# Patient Record
Sex: FEMALE | Race: WHITE | Employment: FULL TIME | URBAN - METROPOLITAN AREA
[De-identification: names, ages, dates, MRNs, and addresses within clinical notes are randomized per-mention and may not be internally consistent; named-entity substitution may affect disease eponyms.]

---

## 2017-06-02 PROBLEM — S62.616A CLOSED DISPLACED FRACTURE OF PROXIMAL PHALANX OF RIGHT LITTLE FINGER, INITIAL ENCOUNTER: Status: ACTIVE | Noted: 2017-06-02

## 2019-07-23 PROCEDURE — 88304 TISSUE EXAM BY PATHOLOGIST: CPT

## 2021-02-15 ENCOUNTER — OFFICE VISIT (OUTPATIENT)
Dept: UROLOGY | Facility: HOSPITAL | Age: 41
End: 2021-02-15
Attending: OBSTETRICS & GYNECOLOGY
Payer: COMMERCIAL

## 2021-02-15 VITALS — BODY MASS INDEX: 25.11 KG/M2 | HEIGHT: 67 IN | TEMPERATURE: 98 F | WEIGHT: 160 LBS

## 2021-02-15 DIAGNOSIS — N81.6 RECTOCELE: ICD-10-CM

## 2021-02-15 DIAGNOSIS — N81.84 PELVIC MUSCLE WASTING: ICD-10-CM

## 2021-02-15 DIAGNOSIS — N39.41 URGE INCONTINENCE: ICD-10-CM

## 2021-02-15 DIAGNOSIS — N81.11 CYSTOCELE, MIDLINE: ICD-10-CM

## 2021-02-15 DIAGNOSIS — N39.3 FEMALE STRESS INCONTINENCE: Primary | ICD-10-CM

## 2021-02-15 PROCEDURE — 99212 OFFICE O/P EST SF 10 MIN: CPT

## 2021-03-02 ENCOUNTER — OFFICE VISIT (OUTPATIENT)
Dept: UROLOGY | Facility: HOSPITAL | Age: 41
End: 2021-03-02
Attending: OBSTETRICS & GYNECOLOGY
Payer: COMMERCIAL

## 2021-03-02 VITALS — BODY MASS INDEX: 25.11 KG/M2 | HEIGHT: 67 IN | WEIGHT: 160 LBS

## 2021-03-02 DIAGNOSIS — N81.11 CYSTOCELE, MIDLINE: Primary | ICD-10-CM

## 2021-03-02 DIAGNOSIS — N39.3 FEMALE STRESS INCONTINENCE: ICD-10-CM

## 2021-03-02 NOTE — PATIENT INSTRUCTIONS
[address]  [place] [address]  [place] [address]: [phone]  FAX: [phone]       Urodynamic Testing Discharge Instructions: There are NO dietary or activity restrictions.   You may resume

## 2021-05-20 PROBLEM — Z86.16 HISTORY OF 2019 NOVEL CORONAVIRUS DISEASE (COVID-19): Status: ACTIVE | Noted: 2021-05-20

## 2021-05-20 PROBLEM — R03.0 ELEVATED BLOOD PRESSURE READING: Status: ACTIVE | Noted: 2021-05-20

## 2021-05-20 PROBLEM — S62.616A CLOSED DISPLACED FRACTURE OF PROXIMAL PHALANX OF RIGHT LITTLE FINGER, INITIAL ENCOUNTER: Status: RESOLVED | Noted: 2017-06-02 | Resolved: 2021-05-20

## 2022-06-17 ENCOUNTER — TELEPHONE (OUTPATIENT)
Dept: UROLOGY | Facility: CLINIC | Age: 42
End: 2022-06-17

## 2022-07-20 ENCOUNTER — OFFICE VISIT (OUTPATIENT)
Dept: UROLOGY | Facility: CLINIC | Age: 42
End: 2022-07-20
Attending: OBSTETRICS & GYNECOLOGY
Payer: COMMERCIAL

## 2022-07-20 VITALS — TEMPERATURE: 97 F | BODY MASS INDEX: 26 KG/M2 | WEIGHT: 163 LBS | RESPIRATION RATE: 16 BRPM

## 2022-07-20 DIAGNOSIS — N39.41 URGE INCONTINENCE: ICD-10-CM

## 2022-07-20 DIAGNOSIS — N39.3 FEMALE STRESS INCONTINENCE: Primary | ICD-10-CM

## 2022-07-20 DIAGNOSIS — N81.11 CYSTOCELE, MIDLINE: ICD-10-CM

## 2022-07-20 LAB
CONTROL RUN WITHIN 24 HOURS?: YES
LEUKOCYTE ESTERASE URINE: NEGATIVE
NITRITE URINE: NEGATIVE

## 2022-07-20 PROCEDURE — 51797 INTRAABDOMINAL PRESSURE TEST: CPT

## 2022-07-20 PROCEDURE — 51728 CYSTOMETROGRAM W/VP: CPT

## 2022-07-20 PROCEDURE — 51784 ANAL/URINARY MUSCLE STUDY: CPT

## 2022-07-20 PROCEDURE — 81002 URINALYSIS NONAUTO W/O SCOPE: CPT

## 2022-07-20 PROCEDURE — 51741 ELECTRO-UROFLOWMETRY FIRST: CPT

## 2022-07-20 NOTE — PROCEDURES
Patient here for urodynamic testing. Procedure explained and confirmed by patient. See evaluation form for results. Both verbal and written discharge instructions were given. Patient tolerated procedure well and will follow up with Dr. Vicky Manzano on 22. URODYNAMIC EVALUATION    PATIENT HISTORY:    Prolapse:  Yes - bothersome  DONNIE:  Yes- bothesome - previous sling in   UUI:  Yes- bothersome  Nocturia:  2  Frequency:  every 1-2 hours  Incomplete Emptying:  Yes  Constipation:  No - this does vary - stays regular when using fiber/miralax  Last void prior to UDS testin hours - drank 48 ounces of water within last hour  Current urge to void? Strong  OAB meds stopped prior to test?  NA  Other symptoms? Surgery? [x]  Not scheduled - is interested  []  Yes, specify date:      PATIENT DIAGNOSIS:  Cystocele, Midline N81.11, Rectocele, Midline R15.9 and Stress Incontinence N39.3    MEDICATION: No outpatient medications have been marked as taking for the 22 encounter (Office Visit) with LIS PROCEDURE. ALLERGIES:  Adhesive Tape, Latex, Covid-19 (Mrna) Vaccine, and Penicillins      EXAM:  Urinalysis Dip:  Today's Results   Component Date Value    control run 2022 Yes     Blood Urine 2022 Trace (A)    Nitrite Urine 2022 Negative     Leukocyte esterase urine 2022 Negative       [place]ves[place] ( >30 degrees ):  [x]  Mobile  []  Fixed    Perineal Sensation:  [x]  Normal  []  Abnormal    Additional Notes:    PROLAPSE (past introitus):  [x]  Yes  []  No  Prolapse reduced for testing?   [x]  Yes  []  No  []  Pessary  [x]  Speculum  []  Proctoswab  []  Vag Packing    Additional Notes:    UROFLOWMETRY:  Voided Volume:           361                  mL  Maximum Flow Rate:              38                  mL/sec  Average flow rate:               16              mL/sec  Post-void Residual:              80             mL  Pattern:  [x]  Normal  []  Poor flow     [] Intermittent  []  Other  Void:   [x]  Typical  []  Atypical    Additional Notes:    CYSTOMETRY:  Urethral Catheter:  Fr 7 / tdoc  Abd Catheter:     Fr 7 / tdoc   Infusion:  Water Rate 50 mL/min  Temp:  Room  Position:  [x]  Sit  []  Stand  []  Supine  First sensation:   97 mL  First desire to void:   146 mL  Strong desire to void:  200 mL  Maximum cystometric capacity:   300 mL  Detrusor Activity:  []  Unstable   [x]  Stable  Urge leakage? []  Yes [x]  No  Volume at 1st unhibited detrusor cont:    mL  Detrusor instability provoked by:    []  Spontaneous []  Coughing  []  Filling  []  Valsalva  []  Other    Additional Notes:      URETHRAL FUNCTION:  Valsava (vesical) Leak Point Pressures:    Volume Leak Point Pressure Leak? Cough Valsalva      100mL 168 70    cm H2O [x]  Yes []  No         REDUCED:  ( half speculum)  Volume Leak Point Pressure Leak? Cough Valsalva      100mL 147     cm H2O [x]  Yes []  No   200mL 125 73    cm H2O [x]  Yes []  No       Genuine Stress Incontinence demonstrated?    [x]  Yes  @ 100 ml with valsalva - unreduced, 100 ml with cough reduced []  No    Resting Urethral Pressure Profile: Pclosure not capturing UPP     Functional Urethral Length:          cm                 cm                 cm  Maximum UCP:           cm             cm                  cm    PRESSURE/FLOW STUDY:  Voided volume:   725     mL  Maximum flow rate:       42 mL/sec  Pressure Detrusor (at maximum flow):      34      cm H2O  Post void residual:        45       mL  Voiding mechanism:  []  Abnormal  [x]  Normal  []  Strain to void   []  Weak detrusor  Void:   [x]  Typical   []  Atypical    Additional Notes:    EMG:  [x]  Reactive []  Non-Reactive    7/20/2022 11:18 AM     PERFORMED BY:  Melanie Myrick RN      URODYNAMIC PHYSICIAN INTERPRETATION    IMPRESSION:     Post-Procedure Diagnoses: Stress urinary incontinence [N39.3], 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

## 2022-07-22 ENCOUNTER — OFFICE VISIT (OUTPATIENT)
Dept: UROLOGY | Facility: CLINIC | Age: 42
End: 2022-07-22
Attending: OBSTETRICS & GYNECOLOGY
Payer: COMMERCIAL

## 2022-07-22 VITALS — HEIGHT: 66 IN | WEIGHT: 163 LBS | TEMPERATURE: 98 F | BODY MASS INDEX: 26.2 KG/M2

## 2022-07-22 DIAGNOSIS — N81.11 CYSTOCELE, MIDLINE: ICD-10-CM

## 2022-07-22 DIAGNOSIS — N81.6 RECTOCELE: ICD-10-CM

## 2022-07-22 DIAGNOSIS — N39.3 FEMALE STRESS INCONTINENCE: Primary | ICD-10-CM

## 2022-07-22 PROCEDURE — 99212 OFFICE O/P EST SF 10 MIN: CPT

## 2022-12-21 ENCOUNTER — OFFICE VISIT (OUTPATIENT)
Dept: UROLOGY | Facility: CLINIC | Age: 42
End: 2022-12-21
Attending: OBSTETRICS & GYNECOLOGY
Payer: COMMERCIAL

## 2022-12-21 VITALS — BODY MASS INDEX: 26.2 KG/M2 | HEIGHT: 66 IN | WEIGHT: 163 LBS

## 2022-12-21 DIAGNOSIS — R33.8 POSTOPERATIVE URINARY RETENTION: Primary | ICD-10-CM

## 2022-12-21 DIAGNOSIS — N99.89 POSTOPERATIVE URINARY RETENTION: Primary | ICD-10-CM

## 2022-12-21 PROCEDURE — 99212 OFFICE O/P EST SF 10 MIN: CPT

## 2022-12-21 RX ORDER — HYDROCODONE BITARTRATE AND ACETAMINOPHEN 5; 325 MG/1; MG/1
1 TABLET ORAL EVERY 6 HOURS PRN
Qty: 20 TABLET | Refills: 0 | Status: SHIPPED | OUTPATIENT
Start: 2022-12-21

## 2022-12-21 NOTE — PROCEDURES
S:   Patient here for voiding trial following surgery  Procedure Date: 12/14/22  Procedure Name: Anterior and Posterior Repair, Cystoscopy, Mid-urethral Sling and Other Excision vaginal polyps, and portions of R and L labia. Doing well, no complaints  BMs regular using Benefiber and Miralax   Pain- pt appears uncomfortable sitting on one buttocks side. Using ice packs, using Ibuprofen, Norco and Tramadol. Pt reports she is out of norco and tramadol. Took 1200mg of Ibuprofen this morning. Denies s/sx of UTI   Tolerates ambulation & diet well. Pt planning to fly to [address] [facility] tmrw to be w/ her daughter having her first baby. O:   There were no vitals filed for this visit. Gen: NAD  Pulm: nl effort  : No active bleeding. Discharge wnl  Surgical sites-WNL. Bruising noted to suprapubic and R buttocks. Gastelum intact and draining clear yellow urine. Disconnected gastelum catheter from drainage tubing. Using a 60 cc Luis A syringe, 400 ml sterile water was instilled per gravity. Balloon deflated using 10 cc syringe, and catheter removed. Pt up to bathroom and voided 375 mL. Patient passes voiding trial.    Patient  tolerated procedure well. A:   Postoperative urinary retention  (primary encounter diagnosis)    P:  Patient passed voiding trial-instructed to call in early afternoon for voiding update. Office number provided. Reviewed post op restrictions and bowel management  Reviewed signs and sx of urinary retention and UTI. Discussed return to [employer]. Advised to get up periodically on plane to move around if she can. Encouraged pt to rest while in [address] [facility]. Spoke to Dr Monica Paulino regarding pt pain. He will e-scribe more norco for pt. Follow-up with Dr. Autumn Easton in 5 weeks.    All questions answered  She understands and agrees to plan

## 2022-12-21 NOTE — PATIENT INSTRUCTIONS
Voiding Trial Instructions  You have passed your voiding trial at 9AM.  Please make sure you are drinking some water today. You can alternate your Ibuprofen and Tylenol every 3 hours, take ONE 600mg ibuprofen, in three hours take two extra strength 500mg tylenol, if needed,  to help with any swelling or pain. It is important to try and empty your bladder every two hours during the day. Try and empty again at 11:30/12noon. If you are unable to empty, try and drink a glass of water and try again 30-60 minutes later. If you have been unable to empty your bladder by 1pm, which is 4 hours after leaving the office, you will most likely be uncomfortable and you will need to come back into the office. If it is after 4pm, go to an urgent care or emergency room to have a catheter placed again. Please call our office at [phone] if you have any questions or concerns.

## 2023-01-12 ENCOUNTER — TELEPHONE (OUTPATIENT)
Dept: UROLOGY | Facility: CLINIC | Age: 43
End: 2023-01-12

## 2023-01-12 NOTE — TELEPHONE ENCOUNTER
TC from pt c/o increasing discomfort for past 5 days. Pt is s/p 12/14/22 Anterior and Posterior Repair, Cystoscopy, Mid-urethral Sling and Excision vaginal polyps, and excision of portions of R and L labia. She reports a \"squeezing and tensing\" sensation vaginally and feels \"a bulge and strings\" as well. States post op bleeding just stopped 5 days ago, denies fever, c/o feeling cold. Bowels regular, using miralax and metamucil. She reports voiding every 2 hrs and stream starts fine, but then half way thru she stops voiding, feels she is not emptying well. Denies feeling retention at this time. Using tylenol/motrin as needed for pain. Spoke w/ and pt inst to come in tomorrow at 10:20am for eval. Pt aware added to schedule.

## 2023-01-13 ENCOUNTER — OFFICE VISIT (OUTPATIENT)
Dept: UROLOGY | Facility: CLINIC | Age: 43
End: 2023-01-13
Attending: OBSTETRICS & GYNECOLOGY
Payer: COMMERCIAL

## 2023-01-13 VITALS — WEIGHT: 163 LBS | HEIGHT: 66 IN | BODY MASS INDEX: 26.2 KG/M2

## 2023-01-13 DIAGNOSIS — N99.89 POSTOPERATIVE URINARY RETENTION: ICD-10-CM

## 2023-01-13 DIAGNOSIS — Z98.890 POSTOPERATIVE STATE: Primary | ICD-10-CM

## 2023-01-13 DIAGNOSIS — R33.8 POSTOPERATIVE URINARY RETENTION: ICD-10-CM

## 2023-01-13 PROCEDURE — 87088 URINE BACTERIA CULTURE: CPT | Performed by: OBSTETRICS & GYNECOLOGY

## 2023-01-13 PROCEDURE — 87086 URINE CULTURE/COLONY COUNT: CPT | Performed by: OBSTETRICS & GYNECOLOGY

## 2023-01-13 PROCEDURE — 99212 OFFICE O/P EST SF 10 MIN: CPT

## 2023-01-13 PROCEDURE — 51798 US URINE CAPACITY MEASURE: CPT | Performed by: OBSTETRICS & GYNECOLOGY

## 2023-01-13 PROCEDURE — 87186 SC STD MICRODIL/AGAR DIL: CPT | Performed by: OBSTETRICS & GYNECOLOGY

## 2023-01-13 PROCEDURE — 51702 INSERT TEMP BLADDER CATH: CPT | Performed by: OBSTETRICS & GYNECOLOGY

## 2023-01-13 RX ORDER — FLUCONAZOLE 150 MG/1
150 TABLET ORAL AS DIRECTED
Qty: 2 TABLET | Refills: 0 | Status: SHIPPED | OUTPATIENT
Start: 2023-01-13

## 2023-01-13 NOTE — PROCEDURES
[facility] Pelvic Medicine  Bladder Scanner Note    Date:  2023    Patient Name:  Grisel Mcgee  Patient :  1980   Patient MRN:  [id number]  Patient Age:  36year old      Diagnosis:  Post Void Residual    Procedure:  [address] Bladder Scanner    Physician:  Dr. Ervin Martinez    RN:  Noreen Lua RN     Bladder scanned per procedure with a residual amount of 460 ml after void in bathroom of 160 ml. Dr. Tom Romero informed. 16F gastelum placed by  w/450 ml clear yellow returned, attached to leg bag and overnight bag sent home w/pt.  To return next week for void trial.

## 2023-01-16 ENCOUNTER — TELEPHONE (OUTPATIENT)
Dept: UROLOGY | Facility: CLINIC | Age: 43
End: 2023-01-16

## 2023-01-16 RX ORDER — NITROFURANTOIN 25; 75 MG/1; MG/1
100 CAPSULE ORAL 2 TIMES DAILY
Qty: 14 CAPSULE | Refills: 0 | Status: SHIPPED | OUTPATIENT
Start: 2023-01-16

## 2023-01-16 NOTE — TELEPHONE ENCOUNTER
TC to pt w/ ucx results. TORB Dr Nicholson-macrobid 100mg po BID x 7d. Pt verbalized an understanding and agrees w/ plan. All questions answered.

## 2023-01-18 ENCOUNTER — OFFICE VISIT (OUTPATIENT)
Dept: UROLOGY | Facility: CLINIC | Age: 43
End: 2023-01-18
Attending: OBSTETRICS & GYNECOLOGY
Payer: COMMERCIAL

## 2023-01-18 VITALS — BODY MASS INDEX: 26.2 KG/M2 | HEIGHT: 66 IN | TEMPERATURE: 98 F | WEIGHT: 163 LBS

## 2023-01-18 DIAGNOSIS — N99.89 POSTOPERATIVE RETENTION OF URINE: Primary | ICD-10-CM

## 2023-01-18 DIAGNOSIS — R33.8 POSTOPERATIVE RETENTION OF URINE: Primary | ICD-10-CM

## 2023-01-18 PROCEDURE — 99212 OFFICE O/P EST SF 10 MIN: CPT

## 2023-01-18 NOTE — PATIENT INSTRUCTIONS
Voiding Trial Instructions  You have passed your voiding trial at 8:30am.  Please make sure you are drinking some water today. You can take your Tylenol/Ibuprofen to help with any swelling from the catheter and continue antibiotics. It is important to try and empty your bladder every two hours during the day. Try and empty again at 11am.  If you are unable to empty, try and drink a glass of water and try again 30-60 minutes later. If you have been unable to empty your bladder by 1:00pm, which is 4 hours after leaving the office, you will most likely be uncomfortable and you will need to come back into the office. If it is after 4pm, go to an urgent care or emergency room to have a catheter placed again. Please call our office at [phone] if you have any questions or concerns.

## 2023-01-18 NOTE — PROGRESS NOTES
TC to pt at home to inquire bladder emptying today s/p VT number 2. Pt states she is voiding but doesn't feel she is emptying all the way. States she feels she empties about half and then 45 min later she has the urge again. Pt wants to call tomorrow am and give us an update as she feels she is emptying and \"doesn't want to panic\". Pt placed on hold to discuss appt options w/clinical coordinator. Pt either hung up or we got disconnected. Called pt back and left VM. Will await call back and call first thing in the morning if I haven't heard from her.

## 2023-01-19 ENCOUNTER — TELEPHONE (OUTPATIENT)
Dept: UROLOGY | Facility: CLINIC | Age: 43
End: 2023-01-19

## 2023-01-19 NOTE — TELEPHONE ENCOUNTER
LVM for pt to call office back to give update on voiding status. Let pt know that she can come in today at 1:30 to check emptying. Will await call back.

## 2023-01-20 NOTE — TELEPHONE ENCOUNTER
JUAN left on nurse line about returning a call that was disconnected from [facility] on 1/19. Called pt and no answer. VM left on her phone to call us back if she feels she is not emptying completely after voids. Passed vt #2.   Office number provided

## 2023-01-27 ENCOUNTER — OFFICE VISIT (OUTPATIENT)
Dept: UROLOGY | Facility: CLINIC | Age: 43
End: 2023-01-27
Attending: OBSTETRICS & GYNECOLOGY
Payer: COMMERCIAL

## 2023-01-27 VITALS — HEIGHT: 66 IN | TEMPERATURE: 98 F | WEIGHT: 163 LBS | BODY MASS INDEX: 26.2 KG/M2

## 2023-01-27 DIAGNOSIS — Z98.890 POSTOPERATIVE STATE: Primary | ICD-10-CM

## 2023-01-27 PROCEDURE — 99212 OFFICE O/P EST SF 10 MIN: CPT

## 2023-02-24 ENCOUNTER — TELEPHONE (OUTPATIENT)
Dept: UROLOGY | Facility: CLINIC | Age: 43
End: 2023-02-24

## 2023-02-24 NOTE — TELEPHONE ENCOUNTER
Pt on schedule 3/6/23   RN chart prep, noted last MD note f/u in 6 months. This RN called pt for clarification; Pt states she has had increased DONNIE after surgery on 12/14/22 and is doing pelvic floor exercise with no sx relief. Apt for 3/6 approved per .

## 2023-06-16 ENCOUNTER — OFFICE VISIT (OUTPATIENT)
Dept: UROLOGY | Facility: CLINIC | Age: 43
End: 2023-06-16
Attending: OBSTETRICS & GYNECOLOGY
Payer: COMMERCIAL

## 2023-06-16 VITALS — TEMPERATURE: 98 F

## 2023-06-16 DIAGNOSIS — N94.10 DYSPAREUNIA, FEMALE: ICD-10-CM

## 2023-06-16 DIAGNOSIS — R10.2 PELVIC PAIN: Primary | ICD-10-CM

## 2023-06-16 PROCEDURE — 99212 OFFICE O/P EST SF 10 MIN: CPT

## 2023-12-04 RX ORDER — METRONIDAZOLE 500 MG/100ML
500 INJECTION, SOLUTION INTRAVENOUS ONCE
Status: CANCELLED | OUTPATIENT
Start: 2023-12-04 | End: 2023-12-04

## 2023-12-04 RX ORDER — CEFAZOLIN SODIUM/WATER 2 G/20 ML
2 SYRINGE (ML) INTRAVENOUS ONCE
Status: CANCELLED | OUTPATIENT
Start: 2023-12-04 | End: 2023-12-04

## 2023-12-05 ENCOUNTER — TELEPHONE (OUTPATIENT)
Dept: UROLOGY | Facility: CLINIC | Age: 43
End: 2023-12-05

## 2023-12-05 NOTE — TELEPHONE ENCOUNTER
Contacted patient to remind of Yearly appt on 12/15 at 11:20am.  Patient stated since she has surgery scheduled with Dr. Adriana Maradiaga on 12/27 should she still come in. Per Clinical Coordinator, patient does not need to come in for yearly on 12/15/23 but needs to schedule \"6 WEEK POST-OP\" appointment. Per SS, patient able to make 2/9/24 at 11:40am appointment. LVM for patient to call back to schedule follow-up.

## 2023-12-05 NOTE — TELEPHONE ENCOUNTER
Attempted to reach patient and was able to speak to patient to schedule 6 week post-op appointment following 12/27/23 surgery. Per patient, date and time offered \"will not work\" due to her current work schedule. Patient states she prefers between 3pm to 8pm.  Patient agrees to having call back once Clinical Coordinator is made aware.

## 2023-12-05 NOTE — TELEPHONE ENCOUNTER
Spoke to patient again to offer other dates for post-op appointments at 11:40am squeeze-in times. Per patient, she is unable to do offered time due to \"being in the middle of my work day\". Patient states she prefers a 9:20am time slot.   With approval from [facility], able to schedule patient for 9:20am on 2/19/24 for 6 week post-op appointment

## 2023-12-26 ENCOUNTER — ANESTHESIA EVENT (OUTPATIENT)
Dept: SURGERY | Facility: HOSPITAL | Age: 43
End: 2023-12-26
Payer: COMMERCIAL

## 2023-12-27 ENCOUNTER — HOSPITAL ENCOUNTER (OUTPATIENT)
Facility: HOSPITAL | Age: 43
Setting detail: HOSPITAL OUTPATIENT SURGERY
Discharge: HOME | End: 2023-12-27
Attending: OBSTETRICS & GYNECOLOGY | Admitting: OBSTETRICS & GYNECOLOGY
Payer: COMMERCIAL

## 2023-12-27 ENCOUNTER — ANESTHESIA (OUTPATIENT)
Dept: SURGERY | Facility: HOSPITAL | Age: 43
End: 2023-12-27
Payer: COMMERCIAL

## 2023-12-27 VITALS
TEMPERATURE: 98 F | HEART RATE: 72 BPM | DIASTOLIC BLOOD PRESSURE: 78 MMHG | OXYGEN SATURATION: 100 % | SYSTOLIC BLOOD PRESSURE: 136 MMHG | BODY MASS INDEX: 21.4 KG/M2 | RESPIRATION RATE: 18 BRPM | HEIGHT: 66 IN | WEIGHT: 133.19 LBS

## 2023-12-27 LAB — B-HCG UR QL: NEGATIVE

## 2023-12-27 PROCEDURE — 81025 URINE PREGNANCY TEST: CPT

## 2023-12-27 PROCEDURE — 0HB9XZZ EXCISION OF PERINEUM SKIN, EXTERNAL APPROACH: ICD-10-PCS | Performed by: OBSTETRICS & GYNECOLOGY

## 2023-12-27 PROCEDURE — 88305 TISSUE EXAM BY PATHOLOGIST: CPT | Performed by: OBSTETRICS & GYNECOLOGY

## 2023-12-27 RX ORDER — ONDANSETRON 2 MG/ML
4 INJECTION INTRAMUSCULAR; INTRAVENOUS EVERY 6 HOURS PRN
Status: DISCONTINUED | OUTPATIENT
Start: 2023-12-27 | End: 2023-12-27

## 2023-12-27 RX ORDER — MEPERIDINE HYDROCHLORIDE 25 MG/ML
12.5 INJECTION INTRAMUSCULAR; INTRAVENOUS; SUBCUTANEOUS AS NEEDED
Status: DISCONTINUED | OUTPATIENT
Start: 2023-12-27 | End: 2023-12-27

## 2023-12-27 RX ORDER — ONDANSETRON 2 MG/ML
INJECTION INTRAMUSCULAR; INTRAVENOUS AS NEEDED
Status: DISCONTINUED | OUTPATIENT
Start: 2023-12-27 | End: 2023-12-27 | Stop reason: SURG

## 2023-12-27 RX ORDER — ACETAMINOPHEN 500 MG
1000 TABLET ORAL ONCE AS NEEDED
Status: COMPLETED | OUTPATIENT
Start: 2023-12-27 | End: 2023-12-27

## 2023-12-27 RX ORDER — PHENYLEPHRINE HCL 10 MG/ML
VIAL (ML) INJECTION AS NEEDED
Status: DISCONTINUED | OUTPATIENT
Start: 2023-12-27 | End: 2023-12-27 | Stop reason: SURG

## 2023-12-27 RX ORDER — SODIUM CHLORIDE, SODIUM LACTATE, POTASSIUM CHLORIDE, CALCIUM CHLORIDE 600; 310; 30; 20 MG/100ML; MG/100ML; MG/100ML; MG/100ML
INJECTION, SOLUTION INTRAVENOUS CONTINUOUS
Status: DISCONTINUED | OUTPATIENT
Start: 2023-12-27 | End: 2023-12-27

## 2023-12-27 RX ORDER — HYDROMORPHONE HYDROCHLORIDE 1 MG/ML
0.4 INJECTION, SOLUTION INTRAMUSCULAR; INTRAVENOUS; SUBCUTANEOUS EVERY 5 MIN PRN
Status: DISCONTINUED | OUTPATIENT
Start: 2023-12-27 | End: 2023-12-27

## 2023-12-27 RX ORDER — HYDROCODONE BITARTRATE AND ACETAMINOPHEN 10; 325 MG/1; MG/1
2 TABLET ORAL ONCE AS NEEDED
Status: COMPLETED | OUTPATIENT
Start: 2023-12-27 | End: 2023-12-27

## 2023-12-27 RX ORDER — MIDAZOLAM HYDROCHLORIDE 1 MG/ML
1 INJECTION INTRAMUSCULAR; INTRAVENOUS EVERY 5 MIN PRN
Status: DISCONTINUED | OUTPATIENT
Start: 2023-12-27 | End: 2023-12-27

## 2023-12-27 RX ORDER — ACETAMINOPHEN 500 MG
1000 TABLET ORAL ONCE
Status: COMPLETED | OUTPATIENT
Start: 2023-12-27 | End: 2023-12-27

## 2023-12-27 RX ORDER — DEXAMETHASONE SODIUM PHOSPHATE 4 MG/ML
VIAL (ML) INJECTION AS NEEDED
Status: DISCONTINUED | OUTPATIENT
Start: 2023-12-27 | End: 2023-12-27 | Stop reason: SURG

## 2023-12-27 RX ORDER — HYDROMORPHONE HYDROCHLORIDE 1 MG/ML
0.6 INJECTION, SOLUTION INTRAMUSCULAR; INTRAVENOUS; SUBCUTANEOUS EVERY 5 MIN PRN
Status: DISCONTINUED | OUTPATIENT
Start: 2023-12-27 | End: 2023-12-27

## 2023-12-27 RX ORDER — PROCHLORPERAZINE EDISYLATE 5 MG/ML
5 INJECTION INTRAMUSCULAR; INTRAVENOUS EVERY 8 HOURS PRN
Status: DISCONTINUED | OUTPATIENT
Start: 2023-12-27 | End: 2023-12-27

## 2023-12-27 RX ORDER — HYDROCODONE BITARTRATE AND ACETAMINOPHEN 5; 325 MG/1; MG/1
1 TABLET ORAL EVERY 6 HOURS PRN
Qty: 20 TABLET | Refills: 0 | Status: SHIPPED | OUTPATIENT
Start: 2023-12-27

## 2023-12-27 RX ORDER — HYDROMORPHONE HYDROCHLORIDE 1 MG/ML
0.2 INJECTION, SOLUTION INTRAMUSCULAR; INTRAVENOUS; SUBCUTANEOUS EVERY 5 MIN PRN
Status: DISCONTINUED | OUTPATIENT
Start: 2023-12-27 | End: 2023-12-27

## 2023-12-27 RX ORDER — MIDAZOLAM HYDROCHLORIDE 1 MG/ML
INJECTION INTRAMUSCULAR; INTRAVENOUS AS NEEDED
Status: DISCONTINUED | OUTPATIENT
Start: 2023-12-27 | End: 2023-12-27 | Stop reason: SURG

## 2023-12-27 RX ORDER — BUPIVACAINE HYDROCHLORIDE AND EPINEPHRINE 2.5; 5 MG/ML; UG/ML
INJECTION, SOLUTION EPIDURAL; INFILTRATION; INTRACAUDAL; PERINEURAL AS NEEDED
Status: DISCONTINUED | OUTPATIENT
Start: 2023-12-27 | End: 2023-12-27 | Stop reason: HOSPADM

## 2023-12-27 RX ORDER — CEFAZOLIN SODIUM/WATER 2 G/20 ML
2 SYRINGE (ML) INTRAVENOUS ONCE
Status: COMPLETED | OUTPATIENT
Start: 2023-12-27 | End: 2023-12-27

## 2023-12-27 RX ORDER — KETOROLAC TROMETHAMINE 30 MG/ML
INJECTION, SOLUTION INTRAMUSCULAR; INTRAVENOUS AS NEEDED
Status: DISCONTINUED | OUTPATIENT
Start: 2023-12-27 | End: 2023-12-27 | Stop reason: SURG

## 2023-12-27 RX ORDER — LIDOCAINE HYDROCHLORIDE 10 MG/ML
INJECTION, SOLUTION EPIDURAL; INFILTRATION; INTRACAUDAL; PERINEURAL AS NEEDED
Status: DISCONTINUED | OUTPATIENT
Start: 2023-12-27 | End: 2023-12-27 | Stop reason: SURG

## 2023-12-27 RX ORDER — IBUPROFEN 600 MG/1
600 TABLET ORAL EVERY 6 HOURS
Qty: 40 TABLET | Refills: 0 | Status: SHIPPED | OUTPATIENT
Start: 2023-12-27

## 2023-12-27 RX ORDER — NALOXONE HYDROCHLORIDE 0.4 MG/ML
0.08 INJECTION, SOLUTION INTRAMUSCULAR; INTRAVENOUS; SUBCUTANEOUS AS NEEDED
Status: DISCONTINUED | OUTPATIENT
Start: 2023-12-27 | End: 2023-12-27

## 2023-12-27 RX ORDER — HYDROCODONE BITARTRATE AND ACETAMINOPHEN 10; 325 MG/1; MG/1
1 TABLET ORAL ONCE AS NEEDED
Status: COMPLETED | OUTPATIENT
Start: 2023-12-27 | End: 2023-12-27

## 2023-12-27 RX ADMIN — DEXAMETHASONE SODIUM PHOSPHATE 8 MG: 4 MG/ML VIAL (ML) INJECTION at 07:12:00

## 2023-12-27 RX ADMIN — PHENYLEPHRINE HCL 50 MCG: 10 MG/ML VIAL (ML) INJECTION at 07:39:00

## 2023-12-27 RX ADMIN — LIDOCAINE HYDROCHLORIDE 100 MG: 10 INJECTION, SOLUTION EPIDURAL; INFILTRATION; INTRACAUDAL; PERINEURAL at 07:05:00

## 2023-12-27 RX ADMIN — KETOROLAC TROMETHAMINE 30 MG: 30 INJECTION, SOLUTION INTRAMUSCULAR; INTRAVENOUS at 07:49:00

## 2023-12-27 RX ADMIN — MIDAZOLAM HYDROCHLORIDE 2 MG: 1 INJECTION INTRAMUSCULAR; INTRAVENOUS at 06:59:00

## 2023-12-27 RX ADMIN — SODIUM CHLORIDE, SODIUM LACTATE, POTASSIUM CHLORIDE, CALCIUM CHLORIDE: 600; 310; 30; 20 INJECTION, SOLUTION INTRAVENOUS at 07:03:00

## 2023-12-27 RX ADMIN — CEFAZOLIN SODIUM/WATER 2 G: 2 G/20 ML SYRINGE (ML) INTRAVENOUS at 07:13:00

## 2023-12-27 RX ADMIN — ONDANSETRON 4 MG: 2 INJECTION INTRAMUSCULAR; INTRAVENOUS at 07:18:00

## 2023-12-27 NOTE — ANESTHESIA POSTPROCEDURE EVALUATION
[address] [facility] Patient Status:  Hospital Outpatient Surgery   Age/Gender 39year old female MRN [id number]   [facility] SURGERY Attending Gisselle Troy MD   Hosp Day # 0 PCP Georges Ness MD       Anesthesia Post-op Note    PERINEOPLASTY    Procedure Summary       Date: 12/27/23 Room / Location: [address] [place] [address] / [address] [place] [address] OR    Anesthesia Start: 6880 Anesthesia Stop: 0802    Procedure: PERINEOPLASTY (Vagina ) Diagnosis: (PELVIC PAIN, DYSPAREUNIA,PERINEAL SCAR)    Surgeons: Gisselle Troy MD Anesthesiologist: Lisandro Mandel MD    Anesthesia Type: general ASA Status: 1            Anesthesia Type: general    Vitals Value Taken Time   /64 12/27/23 0804   Temp 97.7 12/27/23 0804   Pulse 80 12/27/23 0804   Resp 15 12/27/23 0804   SpO2 100 12/27/23 0804       Patient Location: PACU    Anesthesia Type: general    Airway Patency: patent    Postop Pain Control: adequate    Mental Status: preanesthetic baseline    Nausea/Vomiting: none    Cardiopulmonary/Hydration status: stable euvolemic    Complications: no apparent anesthesia related complications    Postop vital signs: stable    Dental Exam: Unchanged from Preop    Patient to be discharged from PACU when criteria met.

## 2023-12-27 NOTE — OPERATIVE REPORT
PRE-OP DIAGNOSIS:  Pelvic pain, dyspareunia, painful perineal scar    POST-OP DIAGNOSIS:  Same    PROCEDURE:  Excision of perineal scar; perineal revision    SURGEON:  Edin Guzman MD    ASSISTANT:  None    ANESTHESIA:  General and local infiltration    SPECIMEN:  Perineal scar    COMPLICATIONS:  None    EBL: 10 ml    DESCRIPTION OF PROCEDURE:     The patient was taken to the operating room, induced under general anesthesia, then placed in the dorsal lithotomy position and prepped and draped in the usual sterile fashion. The [email or web address] vaginal bookwalter retractor was utilized and lonestar stays were placed into the vagina. Local anesthetic was infiltrated and the perineal scar was excised. Metzenbaum scissors were used to mobilize the vaginal epithelium and then 2-0 vicryl suture was then used to close the deep tissue layer in a transverse fashion. The vaginal epithelium was then re-approximated to the perineal skin using 3-0 vicryl suture in an interrupted mattress fashion. Rectal examination confirmed that none of these sutures had impinged the rectum. Additional local anesthetic was injected into the operative site. The patient was then removed from the dorsal lithotomy position, awakened and extubated and transferred from the operating room to the recovery room in stable condition, having tolerated the procedure well.

## 2023-12-27 NOTE — ANESTHESIA PROCEDURE NOTES
Airway  Date/Time: 12/27/2023 7:06 AM  Urgency: elective      General Information and Staff    Patient location during procedure: OR  Anesthesiologist: Juan Rodrigues MD  Resident/CRNA: Belén Diggs CRNA  Performed: CRNA   Performed by: Belén Diggs CRNA  Authorized by: Belén Diggs CRNA      Indications and Patient Condition  Indications for airway management: anesthesia  Sedation level: deep  Preoxygenated: yes  Patient position: sniffing  Mask difficulty assessment: 1 - vent by mask    Final Airway Details  Final airway type: supraglottic airway      Successful airway: classic  Size 3       Number of attempts at approach: 1

## 2024-01-11 NOTE — DISCHARGE INSTRUCTIONS
----- Message from Darrion Blanco sent at 1/11/2024 12:04 PM CST -----  Contact: [phone]@[email or web address]  Good afternoon patient daughter would like qa call back from the doc to discuss the doc calling her mom in something for depression. Please give patient daughter a call back  [phone]     Pelvic Medicine Postoperative Instructions:    PAIN CONTROL  Take Ibuprofen every 6 hours on a schedule. If you need more pain medication, you can take Norco - these are also taken every 6 hours, but you should take one of these in between your doses of ibuprofen (so that you are getting pain medication every 3 hours, but alternating between ibuprofen and Norco)    1. AVOID CONSTIPATION:   -Take Miralax one capful in water or juice each morning.    -Take Fiber supplement along with Miralax as well. These can be mixed together in the same glass of liquid.  -On any day that you don't have a bowel movement, you can take 1-2 teaspoons of Milk of Magnesia that evening   2. No lifting over 10 pounds (1 gallon of milk is 8 pounds). 3. Showers and tub baths are okay, even if you have a catheter or abdominal incision. 4. You may ride in a car immediately. 5. You may drive after 1-2 weeks as long as you are not taking any narcotic pain medications    Please call us for any of the following:  -Temperature above 100.5 for 4 hours or above 101.0 at any time.  -Chest pain or trouble breathing.  -Vaginal bleeding heavier than a period.  -Redness, tenderness or swelling of your legs  -Pain or burning when you urinate; or if you go home with a catheter, trouble with catheter draining.  -Redness, pain or foul discharge from incision.     Office telephone: [phone]  After hours: [phone]      You received a drug called Toradol which is an Anti Inflammatory at: 7:49 AM  If you are allowed to take Anti inflammatories:    Do not take any Anti Inflammatory like Motrin, Aleve or Ibuprophen until after: 1:49 PM  Please report any suspected allergic reactions or bleeding issues to your doctor

## 2024-02-19 ENCOUNTER — TELEPHONE (OUTPATIENT)
Dept: UROLOGY | Facility: CLINIC | Age: 44
End: 2024-02-19

## (undated) DEVICE — STERILE POLYISOPRENE POWDER-FREE SURGICAL GLOVES: Brand: PROTEXIS

## (undated) DEVICE — HOOK RETRCT DIA5MM SHRP E STAY DISP FOR LONE

## (undated) DEVICE — Device

## (undated) DEVICE — PREMIUM WET SKIN PREP TRAY: Brand: MEDLINE INDUSTRIES, INC.

## (undated) DEVICE — STANDARD HYPODERMIC NEEDLE,POLYPROPYLENE HUB: Brand: MONOJECT

## (undated) DEVICE — PENCIL TELESCOPE MEGADYNE SE

## (undated) DEVICE — UNDYED BRAIDED (POLYGLACTIN 910), SYNTHETIC ABSORBABLE SUTURE: Brand: COATED VICRYL

## (undated) DEVICE — SOLUTION IRRIG 1000ML 0.9% NACL USP BTL

## (undated) DEVICE — SLEEVE COMPR M KNEE LEN SGL USE KENDALL SCD

## (undated) DEVICE — TRAY CATH 16FR F INCL BARDX IC COMPLT CARE

## (undated) DEVICE — GYN CDS: Brand: MEDLINE INDUSTRIES, INC.

## (undated) NOTE — LETTER
[address] [facility]  [address] [facility] [address]  [place] [address]  [facility]: [phone]  FAX: [phone]   Consent to Procedure/Sedation    Date: __3/2/2021_____    Time: ___7:59 AM ___    1.  I authorize the performance ___________________________    Signature of person authorized to consent for patient: Relationship to patient:  ___________________________    ___________________    Witness: _[facility] RN___________________  Date: _3/2/21 8:00 am_____________    [facility]

## (undated) NOTE — LETTER
[facility] Testing Department  Phone: [phone]  Right Fax: [phone]    ADDRESSEE INFORMATION: SENDER INFORMATION:   To:   Dr. Delmus Mcburney From: Donavan Garner RN     Department: Pre-Admission Testing   Fax Number: [phone] Date:   2023     Phone Number: [phone] Phone Number: [phone]   Re: Patient Name: Jaison Martinez  CSN: [id number]  Medical Record: [id number]   : 1980 - A: 39 y    Sex: female Fax Number: [phone]     Number of Pages (Including Cover Sheet) 1       Beta-lactam Antibiotic Allergy/Sensitivity/Contraindication    The above patient has a Beta-lactam allergy/sensitivity or contraindication to the antibiotic ordered from your standing orders/[facility] Pre-op Standing orders/[facility] Adult Preoperative Prophylactic Protocol listed below. If you would like the antibiotic given, please fax this form back indicating the override reason with a physician signature/date/and time. Name of Medication: Penicillin                                       Allergic Reaction: Rash    o Proceed with recommended Beta-lactam antibiotic as benefit outweighs risk  o Proceed with recommended Beta-lactam antibiotic as not a true allergy  o Utilize recommended antibiotic for Anaphylaxis or Life-threatening Beta-lactam allergy      If you prefer to order an alternate antibiotic please list drug, dose, route and time to administer below:    _______________________________________________________________________________                   (Antibiotic, dose, route, and time of administration)    ________________________________________Date____________Time________  (MD signature)    Fax this form back to [phone]    This message is intended only for the use of the individual or entity to which it is addressed. It may have been disclosed to you from records whose confidentiality is protected by [employer] and applicable state law. Federal Regulation, 45 C.  F. R., part 164, prohibits you from making any further disclosure without specific authorization of the person to whom it pertains, or as otherwise permitted by such regulations. If the reader of this message is not the intended recipient, you are hereby notified that reading, disseminating, distributing or copying this communication is strictly prohibited. If you have received this communication in error, please immediately notify us by telephone and return the original message to us at [facility]. 15, [address] via the [facility].